# Patient Record
Sex: FEMALE | Race: WHITE | NOT HISPANIC OR LATINO
[De-identification: names, ages, dates, MRNs, and addresses within clinical notes are randomized per-mention and may not be internally consistent; named-entity substitution may affect disease eponyms.]

---
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
## 2021-06-19 ENCOUNTER — EMERGENCY (EMERGENCY)
Facility: HOSPITAL | Age: 21
LOS: 1 days | Discharge: HOME | End: 2021-06-19
Attending: EMERGENCY MEDICINE | Admitting: EMERGENCY MEDICINE
Payer: MEDICAID

## 2021-06-19 VITALS
OXYGEN SATURATION: 98 % | DIASTOLIC BLOOD PRESSURE: 61 MMHG | TEMPERATURE: 98 F | RESPIRATION RATE: 16 BRPM | HEART RATE: 77 BPM | SYSTOLIC BLOOD PRESSURE: 116 MMHG

## 2021-06-19 VITALS
SYSTOLIC BLOOD PRESSURE: 118 MMHG | DIASTOLIC BLOOD PRESSURE: 62 MMHG | RESPIRATION RATE: 18 BRPM | OXYGEN SATURATION: 100 % | HEART RATE: 72 BPM | TEMPERATURE: 98 F

## 2021-06-19 PROCEDURE — 99283 EMERGENCY DEPT VISIT LOW MDM: CPT

## 2021-06-19 NOTE — ED PROVIDER NOTE - OBJECTIVE STATEMENT
20F w/ complaint of labial laceration. States at 3 am was trimming genital area and accidentally cut her labia w/ scissors. Denies any hemorrhage, endorsing mild genital pain. Feels safe at home and denies any trauma to self induced by others.

## 2021-06-19 NOTE — ED PROVIDER NOTE - PATIENT PORTAL LINK FT
You can access the FollowMyHealth Patient Portal offered by [facility] by registering at the following website: [email or web address]. By joining [facility]’s FollowMyHealth portal, you will also be able to view your health information using other applications (apps) compatible with our system.

## 2021-06-19 NOTE — ED PROVIDER NOTE - NS ED MD DISPO DISCHARGE CCDA
Anesthetic History PONV Review of Systems / Medical History Patient summary reviewed and pertinent labs reviewed Pulmonary Within defined limits Neuro/Psych Within defined limits Cardiovascular Hypertension: well controlled Exercise tolerance: <4 METS 
  
GI/Hepatic/Renal 
  
GERD: well controlled Endo/Other Diabetes: type 2 Obesity and cancer (epiglottic SCC) Other Findings Physical Exam 
 
Airway Mallampati: III 
TM Distance: > 6 cm Neck ROM: normal range of motion Mouth opening: Normal 
 
 Cardiovascular Rhythm: regular Pertinent negatives: No murmur Dental 
No notable dental hx Pulmonary Breath sounds clear to auscultation Abdominal 
 
 
 
 Other Findings Anesthetic Plan ASA: 3 Anesthesia type: general 
 
 
 
 
Induction: Intravenous Anesthetic plan and risks discussed with: Patient and Spouse Previous trach and epiglottic edema/tumor plan is for induction intubation and debulking with ETT in place.
Patient/Caregiver provided printed discharge information.

## 2021-06-19 NOTE — ED ADULT NURSE NOTE - OBJECTIVE STATEMENT
Pt received in room 26. Pt A&Ox4, c/o a cut to her vagina. Pt states she was using a scissor to trim hairs and accidently cut herself. Pt states she is not currently bleeding from the cut at this time. Pt denies any chest pain, sob, abd pain, ha, dizziness, n/v/d, fevers/chills. Respirations even and unlabored, no accessory muscle use. MD landrum in progress. Awaiting MD orders at this time.

## 2021-06-19 NOTE — ED ADULT TRIAGE NOTE - TEMPERATURE IN CELSIUS (DEGREES C)
36.9 Chief complaint:   Chief Complaint   Patient presents with   • Abdominal Pain     follow up. States feels much better.       Vitals:  Visit Vitals  /68   Pulse 88   Temp 97.9 °F (36.6 °C) (Oral)   Resp 12   Wt 103.5 kg   BMI 34.70 kg/m²       HISTORY OF PRESENT ILLNESS     Here today for follow up. Pain is resolved. He states the pain resolved after his first day on the medication.  He has been using the miralax and prilosec and isn't sure which has been helping. He notes symptoms started when he returned from vacation. He only ate seafood while he was gone and wonders if this had some effect. Stools have returned to soft and formed twice a day. He has not had any loose stools on the miralax. He notes he was also drinking more beer while on vacation.         Other significant problems:  Patient Active Problem List    Diagnosis Date Noted   • Arthropathy of shoulder region 07/08/2014     Priority: Low   • Prostate asymmetry 10/15/2013     Priority: Low   • Hypertension 10/10/2013     Priority: Low   • Osteoarthritis 10/10/2013     Priority: Low   • Hyperuricemia 10/10/2013     Priority: Low   • Hyperlipidemia 10/10/2013     Priority: Low   • GERD (gastroesophageal reflux disease) 10/10/2013     Priority: Low   • History of colon polyps 10/10/2013     Priority: Low     upper and lower endoscopy in 02/2012. Colonoscopy was recommended to be repeated in 3 years for followup of tubular adenoma polyp           PAST MEDICAL, FAMILY AND SOCIAL HISTORY     Medications:  Current Outpatient Prescriptions   Medication   • vitamin B-12 (CYANOCOBALAMIN) 1000 MCG tablet   • polyethylene glycol (MIRALAX) powder   • omeprazole (PRILOSEC) 20 MG capsule   • ketoconazole (NIZORAL) 2 % shampoo   • meloxicam (MOBIC) 15 MG tablet   • ibuprofen (MOTRIN) 600 MG tablet   • vitamin - therapeutic multivitamins w/minerals (CENTRUM SILVER,THERA-M) Tab   • simvastatin (ZOCOR) 40 MG tablet   • allopurinol (ZYLOPRIM) 100 MG tablet   •  lisinopril (ZESTRIL) 10 MG tablet   • cholecalciferol (VITAMIN D3) 1000 UNITS tablet   • aspirin 81 MG tablet   • Ascorbic Acid (VITAMIN C) 1000 MG tablet     No current facility-administered medications for this visit.        Allergies:  ALLERGIES:   Allergen Reactions   • Penicillin G      Patient reports       Past Medical  History/Surgeries:  Past Medical History:   Diagnosis Date   • Colon polyp    • GERD (gastroesophageal reflux disease)    • HTN (hypertension)    • Hyperlipidemia    • Osteoarthritis        Past Surgical History:   Procedure Laterality Date   • Colonoscopy w/ polypectomy     • Reverse total shoulder arthroplasty  9/12    Dr. Crawford       Family History:  Family History   Problem Relation Age of Onset   • Cancer Mother         Brain cancer   • High blood pressure Father        Social History:  Social History   Substance Use Topics   • Smoking status: Never Smoker   • Smokeless tobacco: Never Used   • Alcohol use 7.2 oz/week     12 Standard drinks or equivalent per week       REVIEW OF SYSTEMS     Review of Systems   Gastrointestinal: Positive for abdominal pain.   All other systems reviewed and are negative.      PHYSICAL EXAM     Physical Exam  Visit Vitals  /68   Pulse 88   Temp 97.9 °F (36.6 °C) (Oral)   Resp 12   Wt 103.5 kg   BMI 34.70 kg/m²     General: appears stated age, is in no apparent distress and is well developed and well nourished  Head: normocephalic, atraumatic  Eyes: extraocular movements are full, lids and lashes are normal, pupils are equal and reactive to light and accommodation, sclerae and conjunctivae are normal  Nose: external nose is normal to inspection and no septal deviation  Ears: pinna and external ear is normal bilaterally, there is no discomfort on traction of the pinna or palpation of the tragus and auditory acuity is grossly normal  Mouth: tongue is midline and appears normal, oropharynx appears normal, soft palate and uvula are normal and oral mucosa is  normal  Neck: neck is supple  Lungs: lungs are clear to auscultation with normal inspiratory/expiratory sounds, no rales, no rhonchi and no wheezes  Heart: normal rate and rhythm, no murmurs and no extra heart sounds  Abdomen: abdomen is soft, normoactive bowel sounds and nontender, no masses, no rebound or guarding.  Extremities: no clubbing, no cyanosis, no edema and normal muscle tone and development bilaterally. Pedal pulses are palpable  Skin: normal color, normal texture, normal turgor, no skin rashes, no atypical appearing skin lesions and no bruises  Neurological: cranial nerves 2 through 12 normal, motor strength normal, gait and station normal, coordination normal and no tremor noted  Psychiatric: appropriate, oriented to person, place and time/date    ASSESSMENT/PLAN     Kavin was seen today for abdominal pain.    Diagnoses and all orders for this visit:    Acute gastritis without hemorrhage, unspecified gastritis type  Resolved. I recommended he continue the prilosec for 1 more week and then discontinue. He agrees to call if symptoms return but has returned to his usual diet and suspects his symptoms were related to his [place] trip.    Constipation, unspecified constipation type  Resolved. I encouraged him to use miralax in the future as needed to regulate stools with goal of 1-2 soft formed stools per day which is normal for him. He will call with any questions or concerns regarding this.

## 2021-06-19 NOTE — ED PROVIDER NOTE - NSFOLLOWUPINSTRUCTIONS_ED_ALL_ED_FT
Please follow up with your primary care provider for further concerns you may have regarding your general health. Attached you will find your results from today's visit. Continue taking your medications as prescribed and keep your upcoming medical appointments.

## 2021-06-19 NOTE — ED PROVIDER NOTE - CLINICAL SUMMARY MEDICAL DECISION MAKING FREE TEXT BOX
20F w/ labia minora tear after trimming self w/ scissors - on exam w/ small 1/4 cm closed laceration to R labia minora - no plan for intervention or sutures, plan for dc w/ pcp follow up.

## 2021-10-06 PROBLEM — Z28.82 IMMUNIZATION NOT CARRIED OUT BECAUSE OF PARENT REFUSAL: Status: RESOLVED | Noted: 2018-04-19 | Resolved: 2021-10-06

## 2024-07-19 NOTE — ED PROVIDER NOTE - TEMPLATE, MLM
I'm sorry this is happening!    The prescription is written for #150 (which would be an entire month) with refills. I'm not sure why she is only receiving #75 at a time. Is her insurance limiting this?     IVIS Arizmendi MD  Internal Medicine-Pediatrics     VIEW ALL

## 2024-10-24 NOTE — ED PROVIDER NOTE - ATTENDING WITH...
Detail Level: Detailed
Detail Level: Generalized
Detail Level: Simple
Skin Checks Recommendations: Full body skin check annually
Sunscreen Recommendations: applied daily to exposed areas
Resident